# Patient Record
Sex: FEMALE | Employment: OTHER | URBAN - NONMETROPOLITAN AREA
[De-identification: names, ages, dates, MRNs, and addresses within clinical notes are randomized per-mention and may not be internally consistent; named-entity substitution may affect disease eponyms.]

---

## 2022-07-05 ENCOUNTER — TELEPHONE (OUTPATIENT)
Dept: SURGERY | Age: 77
End: 2022-07-05

## 2022-07-05 NOTE — LETTER
[facility]         Patient:Apruva Martinez           :1945           Surgical/Procedure Planned: Colonoscopy     Date & Location: 2022 at [facility]       Outpatient   Planned Length of OR: 30 min    Sedation: intravenous sedation        Estimated Cardiac Risk for Non-Cardiac Surgery/Procedure     Low______ Moderate______ High______    Medication Instructions - Clarification needed by this date:       Actos   Hold ___ Days  Amaryl   Hold ___ Days  Coumadin  Hold ___ Days      Resume medications:     Lovenox indicated: _____Yes _____NO    Provider:Dr. Muñoz Overall of Provider Giving Orders for Medication holds:    _____________________________________________

## 2022-07-08 RX ORDER — HYDRALAZINE HYDROCHLORIDE 10 MG/1
10 TABLET, FILM COATED ORAL 2 TIMES DAILY
COMMUNITY

## 2022-07-08 RX ORDER — PIOGLITAZONEHYDROCHLORIDE 30 MG/1
TABLET ORAL
COMMUNITY
Start: 2022-05-19

## 2022-07-08 RX ORDER — FUROSEMIDE 20 MG/1
20 TABLET ORAL DAILY
COMMUNITY

## 2022-07-08 RX ORDER — GLIMEPIRIDE 4 MG/1
TABLET ORAL
COMMUNITY
Start: 2022-05-24

## 2022-07-08 RX ORDER — IBUPROFEN 200 MG
1 CAPSULE ORAL 2 TIMES DAILY
COMMUNITY

## 2022-07-19 NOTE — TELEPHONE ENCOUNTER
[facility] Colonoscopy  Patient:Apurva Kunz                 :1945  (no) patient has seen Dr. Kayce Alex prior to procedure  PCP: Dr. Rogelio Grimm    CC:+ occult stool, family history of colon cancer        HPI:        Colonoscopy  Abd pain: no  Anemia: no  Bloating:no  Diarrhea: no  Constipation: no  Melena: no  Hematochezia:no  Rectal Bleeding:yes  Rectal/Anal Pain:no  Pruritus: no  Family history colon Cancer: yes, patient believes maternal aunt  Previous colon cancer: no  Previous Colon Polyp: no  Change in bowels: no  Decrease caliber of stool: no  Change in color of stool: no    Previous work up date: No previous Colonoscopy    2022 patient had + occult stool       Family History   Problem Relation Age of Onset    Colon Cancer Other      Social History     Socioeconomic History    Marital status: Unknown     Spouse name: Not on file    Number of children: Not on file    Years of education: Not on file    Highest education level: Not on file   Occupational History    Not on file   Tobacco Use    Smoking status: Not on file    Smokeless tobacco: Not on file   Substance and Sexual Activity    Alcohol use: Not on file    Drug use: Not on file    Sexual activity: Not on file   Other Topics Concern    Not on file   Social History Narrative    Not on file     Social Determinants of Health     Financial Resource Strain: Not on file   Food Insecurity: Not on file   Transportation Needs: Not on file   Physical Activity: Not on file   Stress: Not on file   Social Connections: Not on file   Intimate Partner Violence: Not on file   Housing Stability: Not on file     Past Surgical History:   Procedure Laterality Date    A-V CARDIAC PACEMAKER INSERTION      A-V CARDIAC PACEMAKER INSERTION  ;     ABDOMEN SURGERY      mass removed-non cancerous    CARPAL TUNNEL RELEASE Left 2005    CHOLECYSTECTOMY  2017    DILATION AND CURETTAGE OF UTERUS      HYSTERECTOMY (CERVIX STATUS UNKNOWN)  Margret 13 mass removed    OTHER SURGICAL HISTORY  2005    ulnar nerve transposition    TUBAL LIGATION       Past Medical History:   Diagnosis Date    Atrial fibrillation ([facility] [address]     Esophageal reflux     Hypertension     Mixed hyperlipidemia     Obesity     Pacemaker     Sleep apnea     Type 2 diabetes mellitus ([facility] [address]      Current Outpatient Medications on File Prior to Visit   Medication Sig Dispense Refill    atorvastatin (LIPITOR) 80 MG tablet Take 80 mg by mouth daily      glucose blood VI test strips (ASCENSIA AUTODISC VI;ONE TOUCH ULTRA TEST VI) strip 1 each 2 times daily One Touch Ultra Test Strip      diltiazem (CARDIZEM CD) 300 MG ER capsule Take 300 mg by mouth daily      flecainide (TAMBOCOR) 100 MG tablet Take 100 mg by mouth 2 times daily      losartan-hydrochlorothiazide (HYZAAR) 100-25 MG TABS Take by mouth daily      omeprazole (PRILOSEC) 40 MG CPDR Take by mouth 2 times daily      warfarin (COUMADIN) 5 MG tablet Take 5 mg by mouth daily      Lancets MISC 1 each 2 times daily       No current facility-administered medications on file prior to visit.      Allergies as of 07/05/2022 - never reviewed   Allergen Reaction Noted    Codeine  07/08/2015    Kefzol [cefazolin]  07/08/2015    Morphine  07/08/2015    Procardia [nifedipine]  07/08/2015    Sotalol  07/08/2015           PEx:                ASSESSMENT:        PLAN: Colonoscopy

## 2022-07-19 NOTE — TELEPHONE ENCOUNTER
Instructions given and review with the patient. All questions answered and patient denies any further questions at this time. Patient scheduled for CS on 10/17/2022 at [facility] with Dr. Taran Rojas. Instructed patient she can purchase the Miralax/Gatorade bowel prep over the counter at her pharmacy.

## 2022-09-19 ENCOUNTER — TELEPHONE (OUTPATIENT)
Dept: SURGERY | Age: 77
End: 2022-09-19

## 2022-09-19 NOTE — LETTER
[facility]  [address], [place] [address]  Phone [phone]  Fax [phone]      Dr. Hosey Duane    9/19/2022    Dear Dayanara Lopez,    Dr. Honey Garrido reviewed the results of your recent colonoscopy on 9/9/22 at [facility]. The findings include diverticulosis, hemorrhoids and several polyps were removed that pathology determined were all benign (noncancerous) hyperplastic polyps. Routine colon cancer screening is recommended past the age of 76. If you have any questions or concerns regarding your test results or our recommendations, please call the office at [phone].     Sincerely,    Mariah Gonzalez LPN

## 2022-09-19 NOTE — TELEPHONE ENCOUNTER
Letter created and mailed to patient with results from recent colonoscopy at [facility] with Dr. Nuris León on 9/9/22. Updated history, health maintenance, and recall. Forwarded results to PCP.